# Patient Record
Sex: FEMALE | Race: OTHER | NOT HISPANIC OR LATINO | URBAN - NONMETROPOLITAN AREA
[De-identification: names, ages, dates, MRNs, and addresses within clinical notes are randomized per-mention and may not be internally consistent; named-entity substitution may affect disease eponyms.]

---

## 2022-08-10 ENCOUNTER — OFFICE VISIT (OUTPATIENT)
Dept: URGENT CARE | Facility: PHYSICIAN GROUP | Age: 27
End: 2022-08-10
Payer: COMMERCIAL

## 2022-08-10 VITALS
HEIGHT: 63 IN | TEMPERATURE: 98 F | BODY MASS INDEX: 38.98 KG/M2 | WEIGHT: 220 LBS | OXYGEN SATURATION: 98 % | RESPIRATION RATE: 16 BRPM | HEART RATE: 71 BPM | DIASTOLIC BLOOD PRESSURE: 62 MMHG | SYSTOLIC BLOOD PRESSURE: 104 MMHG

## 2022-08-10 DIAGNOSIS — R07.9 CHEST PAIN, UNSPECIFIED TYPE: ICD-10-CM

## 2022-08-10 PROBLEM — N87.1 CERVICAL INTRAEPITHELIAL NEOPLASIA GRADE 2: Status: ACTIVE | Noted: 2019-06-19

## 2022-08-10 PROBLEM — R87.810 CERVICAL HIGH RISK HUMAN PAPILLOMAVIRUS (HPV) DNA TEST POSITIVE: Status: ACTIVE | Noted: 2018-05-21

## 2022-08-10 PROCEDURE — 93000 ELECTROCARDIOGRAM COMPLETE: CPT | Performed by: PHYSICIAN ASSISTANT

## 2022-08-10 PROCEDURE — 99204 OFFICE O/P NEW MOD 45 MIN: CPT | Performed by: PHYSICIAN ASSISTANT

## 2022-08-10 RX ORDER — COPPER 313.4 MG/1
1 INTRAUTERINE DEVICE INTRAUTERINE
COMMUNITY
Start: 2021-01-26 | End: 2033-01-23

## 2022-08-10 ASSESSMENT — ENCOUNTER SYMPTOMS
CHILLS: 0
HEADACHES: 0
VOMITING: 0
DIARRHEA: 0
DIZZINESS: 0
ABDOMINAL PAIN: 0
NERVOUS/ANXIOUS: 1
SHORTNESS OF BREATH: 1
NAUSEA: 0
PALPITATIONS: 1
FEVER: 0
FOCAL WEAKNESS: 0
DIAPHORESIS: 0

## 2022-08-10 NOTE — LETTER
August 10, 2022         Patient: Lucy Wall   YOB: 1995   Date of Visit: 8/10/2022           To Whom it May Concern:    Lucy Wall was seen in my clinic on 8/10/2022.  Please excuse her absence from work today.  She may return to work on 8/11/2022.    If you have any questions or concerns, please don't hesitate to call.        Sincerely,           Ofelia Barrientos P.A.-C.  Electronically Signed

## 2022-08-10 NOTE — PROGRESS NOTES
Subjective     Lucy Wall is a 27 y.o. female who presents with Other (Drank a energy drink at 9 am 3 hours later heart was racing, chest tightness an hands shaky. It all stopped when she got here.)    HPI:  Lucy Wall is a 27 y.o. female who presents today for evaluation of chest pain.  Patient reports that she was sitting at her computer desk approximately 20 minutes ago when all of a sudden she started to get some chest tightness, mild shortness of breath, her hand started shaking, she felt jittery, and she notes that her heart was racing.  She states that she called her grandmother and then immediately drove her here for evaluation.  It seems she arrived in the parking lot her symptoms promptly subsided.  She states that they lasted for probably less than 5 minutes.  States that she does have history of anxiety and panic attacks but normally they are precipitated.  This morning she did drink an energy drink around 9 AM and notes that she has not had any caffeine prior to that for approximately 1 month.  She has no personal history of heart problems but does note a fairly significant cardiac history on her dad side of the family to include multiple members with heart failure and paternal grandfather with multiple MIs.      Review of Systems   Constitutional:  Negative for chills, diaphoresis and fever.   Respiratory:  Positive for shortness of breath.    Cardiovascular:  Positive for chest pain and palpitations.   Gastrointestinal:  Negative for abdominal pain, diarrhea, nausea and vomiting.   Neurological:  Negative for dizziness, focal weakness and headaches.   Psychiatric/Behavioral:  The patient is nervous/anxious.        PMH:  has no past medical history on file.  MEDS: No current outpatient medications on file.  ALLERGIES:   Allergies   Allergen Reactions    Ciprofloxacin Hives, Itching and Rash     Itching all over and rash developed on left hand after 2 days of cipro.  Itching all over and rash  "developed on left hand after 2 days of cipro.       SURGHX: No past surgical history on file.  SOCHX:  reports that she has never smoked. She has never used smokeless tobacco. She reports current alcohol use. She reports that she does not use drugs.  FH: Family history was reviewed, no pertinent findings to report      Objective     /62   Pulse 71   Temp 36.7 °C (98 °F) (Temporal)   Resp 16   Ht 1.6 m (5' 3\")   Wt 99.8 kg (220 lb)   SpO2 98%   BMI 38.97 kg/m²      Physical Exam  Constitutional:       Appearance: She is well-developed.   HENT:      Head: Normocephalic and atraumatic.      Right Ear: External ear normal.      Left Ear: External ear normal.   Eyes:      Conjunctiva/sclera: Conjunctivae normal.      Pupils: Pupils are equal, round, and reactive to light.   Cardiovascular:      Rate and Rhythm: Normal rate and regular rhythm. No extrasystoles are present.     Pulses: Normal pulses.      Heart sounds: Normal heart sounds. No murmur heard.  Pulmonary:      Effort: Pulmonary effort is normal.      Breath sounds: Normal breath sounds. No decreased breath sounds, wheezing, rhonchi or rales.   Musculoskeletal:      Cervical back: Normal range of motion.   Lymphadenopathy:      Cervical: No cervical adenopathy.   Skin:     General: Skin is warm and dry.      Capillary Refill: Capillary refill takes less than 2 seconds.   Neurological:      Mental Status: She is alert and oriented to person, place, and time.   Psychiatric:         Behavior: Behavior normal.         Judgment: Judgment normal.                EKG Interpretation - HR is 63 normal EKG, normal sinus rhythm, there are no previous tracings available for comparison        Assessment & Plan       1. Chest pain, unspecified type  - EKG - Clinic Performed    Physical exam is within normal limits.  Vital signs stable.  EKG normal patient was having symptoms that resolved within approximately 5 minutes.  Symptoms have completely resolved by the " time I am seeing patient in the urgent care.  Did discuss with patient that while I cannot definitively rule out a cardiac cause of her symptoms from the urgent care setting, and this particular case her risk factors for MI are quite low.  Symptoms seem more consistent with anxiety/panic attack or excessive intake of caffeine, or combination of both.  Recommend that patient avoid caffeine completely.  She should take the rest of the day off from work and drink plenty of water.  If she does get a repeat episode of the symptoms she will report directly to the emergency department.  Also recommend that she work on getting established with a primary care provider for follow-up as well.